# Patient Record
Sex: FEMALE | Race: WHITE | URBAN - METROPOLITAN AREA
[De-identification: names, ages, dates, MRNs, and addresses within clinical notes are randomized per-mention and may not be internally consistent; named-entity substitution may affect disease eponyms.]

---

## 2024-07-19 ENCOUNTER — OFFICE VISIT (OUTPATIENT)
Dept: SURGERY | Age: 39
End: 2024-07-19
Payer: COMMERCIAL

## 2024-07-19 VITALS
WEIGHT: 235 LBS | SYSTOLIC BLOOD PRESSURE: 112 MMHG | OXYGEN SATURATION: 97 % | BODY MASS INDEX: 34.8 KG/M2 | HEART RATE: 107 BPM | HEIGHT: 69 IN | DIASTOLIC BLOOD PRESSURE: 74 MMHG

## 2024-07-19 DIAGNOSIS — K80.20 CALCULUS OF GALLBLADDER WITHOUT CHOLECYSTITIS WITHOUT OBSTRUCTION: Primary | ICD-10-CM

## 2024-07-19 PROCEDURE — 99204 OFFICE O/P NEW MOD 45 MIN: CPT | Performed by: SURGERY

## 2024-07-20 NOTE — PROGRESS NOTES
echogenicity within normal limits. No focal hepatic lesion.     2. Cholelithiasis without prominent gallbladder wall thickening. No sonographic Carbajal sign. Possible minimal intraluminal sludge.     3. Mild splenomegaly. No discrete lesion.     4. No pathologic biliary dilation.         CT Abdomen Pelvis w IV Contrast Only    Anatomical Region Laterality Modality   Abdomen -- Computed Tomography   Pelvis -- --   Hip -- --     Impression    1. No acute abdominopelvic findings.    2. Systemic veins are well opacified.    Signed by: 5/12/2022 8:58 AM: Jamar Thayer MD  Narrative      EXAM: CT ABDOMEN PELVIS W IV CONTRAST ONLY    INDICATION: R60.0 Localized edema I10;;CT iliac vein, r/o extrinsic compression    COMPARISON: None.    TECHNIQUE: Volume acquisition of the abdomen and pelvis was obtained from the diaphragms to the symphysis pubis during the intravenous administration of non-ionic contrast. Oral contrast was not administered. Axial, coronal and sagittal images were constructed and reviewed.    CONTRAST VOLUME: 100 mL Omnipaque 350      FINDINGS:  Lower Thorax: Lung bases are clear. No pleural or pericardial effusion.    Abdomen and pelvis:  Liver: Homogeneous enhancement. No focal lesions.  Gallbladder/Biliary: No radiopaque gallstones. No biliary dilation.  Pancreas: Normal size and attenuation. No ductal dilation.  Spleen: Normal in size.    Adrenals: No masses.  Kidneys: No hydronephrosis or renal stones. No solid masses.  Bladder: Normal.    Gastrointestinal: No abnormal dilation or wall thickening. Normal appendix.  Peritoneum: No free air. No free fluid.    Vasculature: Normal caliber abdominal aorta. Portal veins, splenic vein, and SMV are patent. Systemic veins are well opacified.  Lymph Nodes: No lymphadenopathy.    Reproductive: Uterus is present. No adnexal masses.    Soft tissues: Small fat-containing umbilical hernia.  Bones: No acute fractures or aggressive osseous